# Patient Record
Sex: FEMALE | Race: WHITE | NOT HISPANIC OR LATINO | Employment: FULL TIME | URBAN - METROPOLITAN AREA
[De-identification: names, ages, dates, MRNs, and addresses within clinical notes are randomized per-mention and may not be internally consistent; named-entity substitution may affect disease eponyms.]

---

## 2017-02-03 RX ORDER — ALPRAZOLAM 0.25 MG/1
TABLET ORAL
Qty: 30 TABLET | Refills: 0 | Status: SHIPPED | OUTPATIENT
Start: 2017-02-03 | End: 2017-03-04 | Stop reason: SDUPTHER

## 2017-02-03 NOTE — TELEPHONE ENCOUNTER
DANIELE has been placed on your desk for review.  Patient got medication filled from Dr. Barnett last month.  Please advise.

## 2017-02-14 ENCOUNTER — TRANSCRIBE ORDERS (OUTPATIENT)
Dept: CARDIAC REHAB | Facility: HOSPITAL | Age: 67
End: 2017-02-14

## 2017-02-14 ENCOUNTER — APPOINTMENT (OUTPATIENT)
Dept: CARDIAC REHAB | Facility: HOSPITAL | Age: 67
End: 2017-02-14

## 2017-02-14 DIAGNOSIS — I21.4 NON-ST ELEVATED MYOCARDIAL INFARCTION (NON-STEMI) (HCC): Primary | ICD-10-CM

## 2017-02-15 ENCOUNTER — TREATMENT (OUTPATIENT)
Dept: CARDIAC REHAB | Facility: HOSPITAL | Age: 67
End: 2017-02-15

## 2017-02-15 DIAGNOSIS — Z86.74 H/O CARDIAC ARREST: ICD-10-CM

## 2017-02-15 DIAGNOSIS — Z95.5 S/P CORONARY ARTERY STENT PLACEMENT: ICD-10-CM

## 2017-02-15 DIAGNOSIS — I21.4 MI, ACUTE, NON ST SEGMENT ELEVATION (HCC): Primary | ICD-10-CM

## 2017-02-15 PROCEDURE — 93798 PHYS/QHP OP CAR RHAB W/ECG: CPT

## 2017-02-17 ENCOUNTER — TREATMENT (OUTPATIENT)
Dept: CARDIAC REHAB | Facility: HOSPITAL | Age: 67
End: 2017-02-17

## 2017-02-17 DIAGNOSIS — I21.4 MI, ACUTE, NON ST SEGMENT ELEVATION (HCC): Primary | ICD-10-CM

## 2017-02-17 DIAGNOSIS — Z86.74 H/O CARDIAC ARREST: ICD-10-CM

## 2017-02-17 DIAGNOSIS — Z95.5 S/P CORONARY ARTERY STENT PLACEMENT: ICD-10-CM

## 2017-02-17 PROCEDURE — 93798 PHYS/QHP OP CAR RHAB W/ECG: CPT

## 2017-02-20 ENCOUNTER — TREATMENT (OUTPATIENT)
Dept: CARDIAC REHAB | Facility: HOSPITAL | Age: 67
End: 2017-02-20

## 2017-02-20 DIAGNOSIS — I21.4 MI, ACUTE, NON ST SEGMENT ELEVATION (HCC): Primary | ICD-10-CM

## 2017-02-20 DIAGNOSIS — Z95.5 S/P CORONARY ARTERY STENT PLACEMENT: ICD-10-CM

## 2017-02-20 DIAGNOSIS — Z86.74 H/O CARDIAC ARREST: ICD-10-CM

## 2017-02-20 PROCEDURE — 93798 PHYS/QHP OP CAR RHAB W/ECG: CPT

## 2017-02-22 ENCOUNTER — TREATMENT (OUTPATIENT)
Dept: CARDIAC REHAB | Facility: HOSPITAL | Age: 67
End: 2017-02-22

## 2017-02-22 DIAGNOSIS — Z86.74 H/O CARDIAC ARREST: ICD-10-CM

## 2017-02-22 DIAGNOSIS — Z95.5 S/P CORONARY ARTERY STENT PLACEMENT: ICD-10-CM

## 2017-02-22 DIAGNOSIS — I21.4 MI, ACUTE, NON ST SEGMENT ELEVATION (HCC): Primary | ICD-10-CM

## 2017-02-22 PROCEDURE — 93798 PHYS/QHP OP CAR RHAB W/ECG: CPT

## 2017-02-24 ENCOUNTER — TREATMENT (OUTPATIENT)
Dept: CARDIAC REHAB | Facility: HOSPITAL | Age: 67
End: 2017-02-24

## 2017-02-24 DIAGNOSIS — Z95.5 S/P CORONARY ARTERY STENT PLACEMENT: ICD-10-CM

## 2017-02-24 DIAGNOSIS — I21.4 MI, ACUTE, NON ST SEGMENT ELEVATION (HCC): Primary | ICD-10-CM

## 2017-02-24 DIAGNOSIS — Z86.74 H/O CARDIAC ARREST: ICD-10-CM

## 2017-02-24 PROCEDURE — 93798 PHYS/QHP OP CAR RHAB W/ECG: CPT

## 2017-02-27 ENCOUNTER — TREATMENT (OUTPATIENT)
Dept: CARDIAC REHAB | Facility: HOSPITAL | Age: 67
End: 2017-02-27

## 2017-02-27 DIAGNOSIS — I21.4 MI, ACUTE, NON ST SEGMENT ELEVATION (HCC): Primary | ICD-10-CM

## 2017-02-27 DIAGNOSIS — Z95.5 S/P CORONARY ARTERY STENT PLACEMENT: ICD-10-CM

## 2017-02-27 DIAGNOSIS — Z86.74 H/O CARDIAC ARREST: ICD-10-CM

## 2017-02-27 PROCEDURE — 93798 PHYS/QHP OP CAR RHAB W/ECG: CPT

## 2017-03-03 ENCOUNTER — TREATMENT (OUTPATIENT)
Dept: CARDIAC REHAB | Facility: HOSPITAL | Age: 67
End: 2017-03-03

## 2017-03-03 DIAGNOSIS — Z95.5 S/P CORONARY ARTERY STENT PLACEMENT: ICD-10-CM

## 2017-03-03 DIAGNOSIS — I21.4 MI, ACUTE, NON ST SEGMENT ELEVATION (HCC): Primary | ICD-10-CM

## 2017-03-03 DIAGNOSIS — Z86.74 H/O CARDIAC ARREST: ICD-10-CM

## 2017-03-03 PROCEDURE — 93798 PHYS/QHP OP CAR RHAB W/ECG: CPT

## 2017-03-06 RX ORDER — ALPRAZOLAM 0.25 MG/1
TABLET ORAL
Qty: 30 TABLET | Refills: 0 | OUTPATIENT
Start: 2017-03-06

## 2017-03-06 RX ORDER — ALPRAZOLAM 0.25 MG/1
TABLET ORAL
Qty: 30 TABLET | Refills: 3 | Status: SHIPPED | OUTPATIENT
Start: 2017-03-06 | End: 2017-05-01 | Stop reason: SDUPTHER

## 2017-03-06 NOTE — PROGRESS NOTES
CARDIAC/PULMONARY REHAB NUTRITION EDUCATION/ASSESSMENT      66 y.o.         Height: 67  in    Weight: 143  lb     BMI: 22.39 IBW:  135-145 lb.       Diet Survey Score: 60  Cardiac Risk Factors: Stress, family history Weight Assessment: Normal     Current Diet: moderate calorie, low fat   Food records reviewed? Yes     Occupation:  Adminisrrative  Job Activity Level:mild    Routine Exercise: strong            Pertinent Lab Values:   Total: No components found for: CHOLESTEROL  HDL:   HDL CHOLESTEROL   Date Value Ref Range Status   11/29/2016 33 (L) 40 - 60 mg/dL Final     LDL:No results found for: LDL  Triglyceride: No components found for: TRIGLYCERIDE  Last HGBA1C with date if applicable:No components found for: A1C  Glucose:   GLUCOSE   Date Value Ref Range Status   11/29/2016 99 65 - 99 mg/dL Final    Nutritional Supplements: N/A    Pertinent Nutrition-Related Medications:  N/A         Stated Problem Areas / Concerns: controlling stress,maintaining lifestyle     Assessment / Recommendations: We reviewed AHA guidelines and the merits of a plant based diet  Motivation level toward diet compliance: strong       EdWritten materials given:  Reference information on CVD         Goals:  Continue efforts to follow a heart healthy diet. Maintain exercise as tolerated                 8:08 AM  3/6/2017  Althea Zavala RD

## 2017-03-08 ENCOUNTER — TREATMENT (OUTPATIENT)
Dept: CARDIAC REHAB | Facility: HOSPITAL | Age: 67
End: 2017-03-08

## 2017-03-08 DIAGNOSIS — I21.4 MI, ACUTE, NON ST SEGMENT ELEVATION (HCC): Primary | ICD-10-CM

## 2017-03-08 DIAGNOSIS — Z86.74 H/O CARDIAC ARREST: ICD-10-CM

## 2017-03-08 DIAGNOSIS — Z95.5 S/P CORONARY ARTERY STENT PLACEMENT: ICD-10-CM

## 2017-03-08 PROCEDURE — 93798 PHYS/QHP OP CAR RHAB W/ECG: CPT

## 2017-03-13 ENCOUNTER — APPOINTMENT (OUTPATIENT)
Dept: CARDIAC REHAB | Facility: HOSPITAL | Age: 67
End: 2017-03-13

## 2017-03-15 ENCOUNTER — APPOINTMENT (OUTPATIENT)
Dept: CARDIAC REHAB | Facility: HOSPITAL | Age: 67
End: 2017-03-15

## 2017-03-17 ENCOUNTER — APPOINTMENT (OUTPATIENT)
Dept: CARDIAC REHAB | Facility: HOSPITAL | Age: 67
End: 2017-03-17

## 2017-03-20 ENCOUNTER — APPOINTMENT (OUTPATIENT)
Dept: CARDIAC REHAB | Facility: HOSPITAL | Age: 67
End: 2017-03-20

## 2017-03-22 ENCOUNTER — APPOINTMENT (OUTPATIENT)
Dept: CARDIAC REHAB | Facility: HOSPITAL | Age: 67
End: 2017-03-22

## 2017-03-24 ENCOUNTER — APPOINTMENT (OUTPATIENT)
Dept: CARDIAC REHAB | Facility: HOSPITAL | Age: 67
End: 2017-03-24

## 2017-03-27 ENCOUNTER — APPOINTMENT (OUTPATIENT)
Dept: CARDIAC REHAB | Facility: HOSPITAL | Age: 67
End: 2017-03-27

## 2017-03-29 ENCOUNTER — APPOINTMENT (OUTPATIENT)
Dept: CARDIAC REHAB | Facility: HOSPITAL | Age: 67
End: 2017-03-29

## 2017-03-31 ENCOUNTER — APPOINTMENT (OUTPATIENT)
Dept: CARDIAC REHAB | Facility: HOSPITAL | Age: 67
End: 2017-03-31

## 2017-04-03 ENCOUNTER — APPOINTMENT (OUTPATIENT)
Dept: CARDIAC REHAB | Facility: HOSPITAL | Age: 67
End: 2017-04-03

## 2017-04-05 ENCOUNTER — APPOINTMENT (OUTPATIENT)
Dept: CARDIAC REHAB | Facility: HOSPITAL | Age: 67
End: 2017-04-05

## 2017-04-07 ENCOUNTER — APPOINTMENT (OUTPATIENT)
Dept: CARDIAC REHAB | Facility: HOSPITAL | Age: 67
End: 2017-04-07

## 2017-04-10 ENCOUNTER — APPOINTMENT (OUTPATIENT)
Dept: CARDIAC REHAB | Facility: HOSPITAL | Age: 67
End: 2017-04-10

## 2017-04-12 ENCOUNTER — APPOINTMENT (OUTPATIENT)
Dept: CARDIAC REHAB | Facility: HOSPITAL | Age: 67
End: 2017-04-12

## 2017-04-14 ENCOUNTER — APPOINTMENT (OUTPATIENT)
Dept: CARDIAC REHAB | Facility: HOSPITAL | Age: 67
End: 2017-04-14

## 2017-04-17 ENCOUNTER — APPOINTMENT (OUTPATIENT)
Dept: CARDIAC REHAB | Facility: HOSPITAL | Age: 67
End: 2017-04-17

## 2017-04-19 ENCOUNTER — APPOINTMENT (OUTPATIENT)
Dept: CARDIAC REHAB | Facility: HOSPITAL | Age: 67
End: 2017-04-19

## 2017-04-21 ENCOUNTER — APPOINTMENT (OUTPATIENT)
Dept: CARDIAC REHAB | Facility: HOSPITAL | Age: 67
End: 2017-04-21

## 2017-04-24 ENCOUNTER — APPOINTMENT (OUTPATIENT)
Dept: CARDIAC REHAB | Facility: HOSPITAL | Age: 67
End: 2017-04-24

## 2017-04-26 ENCOUNTER — APPOINTMENT (OUTPATIENT)
Dept: CARDIAC REHAB | Facility: HOSPITAL | Age: 67
End: 2017-04-26

## 2017-04-28 ENCOUNTER — APPOINTMENT (OUTPATIENT)
Dept: CARDIAC REHAB | Facility: HOSPITAL | Age: 67
End: 2017-04-28

## 2017-05-01 ENCOUNTER — APPOINTMENT (OUTPATIENT)
Dept: CARDIAC REHAB | Facility: HOSPITAL | Age: 67
End: 2017-05-01

## 2017-05-02 ENCOUNTER — TELEPHONE (OUTPATIENT)
Dept: FAMILY MEDICINE CLINIC | Facility: CLINIC | Age: 67
End: 2017-05-02

## 2017-05-02 RX ORDER — ALPRAZOLAM 0.25 MG/1
0.25 TABLET ORAL DAILY
Qty: 30 TABLET | Refills: 1 | OUTPATIENT
Start: 2017-05-02

## 2017-05-02 RX ORDER — ALPRAZOLAM 0.25 MG/1
TABLET ORAL
Qty: 30 TABLET | Refills: 0 | OUTPATIENT
Start: 2017-05-02 | End: 2017-05-02 | Stop reason: SDUPTHER

## 2017-05-03 ENCOUNTER — APPOINTMENT (OUTPATIENT)
Dept: CARDIAC REHAB | Facility: HOSPITAL | Age: 67
End: 2017-05-03

## 2017-05-05 ENCOUNTER — APPOINTMENT (OUTPATIENT)
Dept: CARDIAC REHAB | Facility: HOSPITAL | Age: 67
End: 2017-05-05

## 2017-05-08 ENCOUNTER — APPOINTMENT (OUTPATIENT)
Dept: CARDIAC REHAB | Facility: HOSPITAL | Age: 67
End: 2017-05-08

## 2017-05-10 ENCOUNTER — APPOINTMENT (OUTPATIENT)
Dept: CARDIAC REHAB | Facility: HOSPITAL | Age: 67
End: 2017-05-10

## 2017-05-12 ENCOUNTER — APPOINTMENT (OUTPATIENT)
Dept: CARDIAC REHAB | Facility: HOSPITAL | Age: 67
End: 2017-05-12

## 2017-05-15 ENCOUNTER — APPOINTMENT (OUTPATIENT)
Dept: CARDIAC REHAB | Facility: HOSPITAL | Age: 67
End: 2017-05-15

## 2017-05-17 ENCOUNTER — APPOINTMENT (OUTPATIENT)
Dept: CARDIAC REHAB | Facility: HOSPITAL | Age: 67
End: 2017-05-17

## 2017-05-19 ENCOUNTER — APPOINTMENT (OUTPATIENT)
Dept: CARDIAC REHAB | Facility: HOSPITAL | Age: 67
End: 2017-05-19

## 2020-10-05 ENCOUNTER — HOSPITAL ENCOUNTER (OUTPATIENT)
Dept: GENERAL RADIOLOGY | Facility: HOSPITAL | Age: 70
Discharge: HOME | End: 2020-10-05
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
## 2020-10-05 PROCEDURE — 73030 X-RAY EXAM OF SHOULDER: CPT

## 2020-10-05 PROCEDURE — 72050 X-RAY EXAM NECK SPINE 4/5VWS: CPT

## 2021-06-28 ENCOUNTER — TRANSCRIBE ORDERS (OUTPATIENT)
Dept: PHYSICAL THERAPY | Facility: CLINIC | Age: 71
End: 2021-06-28

## 2021-06-28 DIAGNOSIS — Z96.641 STATUS POST TOTAL HIP REPLACEMENT, RIGHT: Primary | ICD-10-CM

## 2021-08-11 ENCOUNTER — TREATMENT (OUTPATIENT)
Dept: PHYSICAL THERAPY | Facility: CLINIC | Age: 71
End: 2021-08-11

## 2021-08-11 DIAGNOSIS — M25.551 RIGHT HIP PAIN: Primary | ICD-10-CM

## 2021-08-11 DIAGNOSIS — R26.2 DIFFICULTY WALKING: ICD-10-CM

## 2021-08-11 PROCEDURE — 97110 THERAPEUTIC EXERCISES: CPT | Performed by: PHYSICAL THERAPIST

## 2021-08-11 PROCEDURE — 97162 PT EVAL MOD COMPLEX 30 MIN: CPT | Performed by: PHYSICAL THERAPIST

## 2021-08-11 NOTE — PROGRESS NOTES
Physical Therapy Evaluation      Patient: Kyleigh Curry   : 1950  Diagnosis/ICD-10 Code:  Right hip pain [M25.551]  Referring practitioner: Israel Carrion MD  Date of Initial Visit: Type: THERAPY  Noted: 2021  Today's Date: 2021  Patient seen for 1 sessions         Subjective Evaluation     History of Present Illness  Mechanism of injury: Patient underwent R MEGHAN on 2021. Posterior approach by Dr. Rowland. Patient had home health therapy, which she was discharged from on . Prior to surgery, patient reports that she has had pain in her hip from OA for around 10 years. The pain worsened over time, eventually bothering her during all activities and rest.    Since her surgery, patient reports that she has pain from her hip down to her mid thigh, which worsens with activity. She has lack of sensation from her R lateral hip to mid-thigh. She walks with a cane and is only able to ambulate short distances at this time.    PLOF: Before MEGHAN, patient played tennis 4x/wk and worked out 3x/wk at [employer]. Did not use AD    PMH: Controlled HTN. Patient reports she had a stent placed.       Patient Occupation: retired  Quality of life: good    Pain  Current pain rating: 3  At best pain ratin  At worst pain ratin  Quality: throbbing and discomfort (irritating)  Relieving factors: medications, ice, relaxation and rest (extra strength)  Aggravating factors: stairs, ambulation, movement, standing and sleeping  Progression: improved     Social Support  Lives in: multiple-level home (with elevator )  Lives with: spouse     Diagnostic Tests  X-ray: normal (MEGHAN healing)     Treatments  Current treatment: home therapy  Patient Goals  Patient goals for therapy: increased strength, decreased edema, decreased pain, increased motion, improved balance and return to sport/leisure activities  Patient goal: walk for exercise , walk without cane              Objective            Active Range of Motion    Left Hip   External rotation (90/90): 18 degrees   Internal rotation (90/90): 20 degrees      Right Hip   External rotation (90/90): 15 degrees with pain  Internal rotation (90/90): 10 degrees with pain     Passive Range of Motion      Right Hip   Flexion: 39 degrees   Abduction: 10 degrees     Additional Passive Range of Motion Details  Flexion measured in supine. In sitting patient able to sit at 90     Strength/Myotome Testing      Left Hip   Planes of Motion   Flexion: 4-  Abduction: 3     Right Hip   Planes of Motion   Flexion: 3-     Left Knee   Flexion: 4  Extension: 4+     Right Knee   Flexion: 4-  Extension: 3+     Left Ankle/Foot   Dorsiflexion: 4     Right Ankle/Foot   Dorsiflexion: 4     Ambulation      Comments   With SC: L shoulder elevated, slow ronal, varum in L Knee, lack of arm swing, lack of heel strike at initial contact.       Sensation  Patient has diminished sensation in her R lateral hip to R lateral mid thigh.    Observation  Patient's incision looks good. No signs of infection.     See Exercise, Manual, and Modality Logs for complete treatment.         Functional Outcome Score: LEFS: 44/80     EXERCISES:  -Supine hip abduction x15  -Supine marches x30  -Supine knee slides x10     Assessment & Plan     Assessment  Impairments: abnormal gait, abnormal muscle firing, abnormal or restricted ROM, activity intolerance, impaired balance, impaired physical strength, lacks appropriate home exercise program, pain with function and safety issue  Assessment details: Kyleigh Curry is a 71 y.o. year-old female referred to physical therapy for R hip pain s/p MEGHAN on 7/27/21. She presents with a evolving clinical presentation.  She  has comorbidities of hypertension and no known personal factors which may affect her progress in the plan of care.  Signs and symptoms are consistent with physical therapy diagnosis of R hip pain and difficulty walking. Patient is appropriate for skilled physical therapy in  order to reduce pain and increase ease with daily mobility.   Prognosis: good  Functional Limitations: walking, uncomfortable because of pain, sitting and standing  Goals  Plan Goals: Short Term Goals for completion in 30 days:   1) Patient will report compliance and independence with initial HEP   2) Patient will improve score on LEFS from 44/80 to a 52/80 to demonstrate an improvement in function w/ ADLs.  3) Patient will rate her maximal pain a 5/10 (at eval 8/10) in order to increase ease with movement and improve QOL.    LTGs for completion within 90 days:  1) Patient will increase walking tolerance from 5 min to 20 min or greater to allow for patient to walk for leisure/exercise   2) Patient will increase R LE hip flexion and abduction strength to 4/5 or greater to increase LE stability during gait    3) Patient will be able to walk community distances without use of cane in order to increase independence with activities like grocery shopping.   4) Patient will improve hip ER ROM to 25 degrees bilaterally in order to increase ease with putting on shoes.        Plan  Therapy options: will be seen for skilled physical therapy services  Planned modality interventions: dry needling, hydrotherapy, traction, TENS, high voltage pulsed current (pain management) and ultrasound  Planned therapy interventions: transfer training, home exercise program, therapeutic activities, stretching, gait training, functional ROM exercises, strengthening, flexibility, postural training, spinal/joint mobilization, ADL retraining and manual therapy  Frequency: 2x week  Duration in visits: 20  Duration in weeks: 10  Treatment plan discussed with: patient  Plan details: Physical therapy for hip strengthening, balance, gait training, LE ROM, and pain relief.             Manual Therapy:         mins  58656;  Therapeutic Exercise:    8     mins  07827;     Neuromuscular Yanely:        mins  95124;    Therapeutic Activity:          mins  98413;      Gait Training:           mins  41636;     Ultrasound:          mins  31113;    Electrical Stimulation:         mins  80447 ( );  Dry Needling          mins self-pay    Timed Treatment:   8   mins   Total Treatment:     40   mins    Sonal Rosales PT  Physical Therapist      I was present in the PT Department guiding the student by approving, concurring, and confirming the skilled judgement for all services rendered

## 2021-08-13 ENCOUNTER — TREATMENT (OUTPATIENT)
Dept: PHYSICAL THERAPY | Facility: CLINIC | Age: 71
End: 2021-08-13

## 2021-08-13 DIAGNOSIS — M25.551 RIGHT HIP PAIN: Primary | ICD-10-CM

## 2021-08-13 DIAGNOSIS — R26.2 DIFFICULTY WALKING: ICD-10-CM

## 2021-08-13 PROCEDURE — 97116 GAIT TRAINING THERAPY: CPT | Performed by: PHYSICAL THERAPIST

## 2021-08-13 PROCEDURE — 97110 THERAPEUTIC EXERCISES: CPT | Performed by: PHYSICAL THERAPIST

## 2021-08-13 NOTE — PROGRESS NOTES
Physical Therapy Daily Progress Note    Patient: Kyleigh Curry   : 1950  Diagnosis/ICD-10 Code:  Right hip pain [M25.551]  Referring practitioner: Israel Carrion MD  Date of Initial Visit: Type: THERAPY  Noted: 2021  Today's Date: 2021  Patient seen for 2 sessions           Subjective   Patient reports she has been doing her exercises at home and walking up and down stairs more.    Objective   See Exercise, Manual, and Modality Logs for complete treatment.     EXERCISES:  -NuStep lvl 8 x5 min  -Gait training w/ SC 1.5 laps  -1K leg press 1x20 B 80 lbs, 2x20 140 lb  -6K Hip abduction 3x15 25 lb  -Weighted hip extension, 2.5 lb 2x15  -Standing hip abduction 1x15  -Supine hip abduction x15  -Supine marches x30  -Supine knee slides x10      Assessment/Plan  First treatment session today. Focused on standing up tall and even step length during ambulation. Gait deficits became more pronounced with increased distance. Attempted weighted standing hip abduction but patient was unable to perform without significant pain.       Timed:    Manual Therapy:         mins  48624;  Therapeutic Exercise:    30     mins  00402;     Neuromuscular Yanely:        mins  92833;    Therapeutic Activity:          mins  45641;     Gait Training:      10     mins  62096;     Ultrasound:          mins  98885;    Electrical Stimulation:         mins  08962 ( );  Iontophoresis         mins 45022;  Aquatic Therapy         mins 82764;  Dry Needling                   mins    Untimed:  Electrical Stimulation:         mins  96057 ( );  Mechanical Traction:         mins  67917;     Timed Treatment:   40   mins   Total Treatment:     40   mins  Sonal Rosales PT  Physical Therapist

## 2021-08-16 ENCOUNTER — TREATMENT (OUTPATIENT)
Dept: PHYSICAL THERAPY | Facility: CLINIC | Age: 71
End: 2021-08-16

## 2021-08-16 DIAGNOSIS — M25.551 RIGHT HIP PAIN: Primary | ICD-10-CM

## 2021-08-16 DIAGNOSIS — R26.2 DIFFICULTY WALKING: ICD-10-CM

## 2021-08-16 PROCEDURE — 97116 GAIT TRAINING THERAPY: CPT | Performed by: PHYSICAL THERAPIST

## 2021-08-16 PROCEDURE — 97110 THERAPEUTIC EXERCISES: CPT | Performed by: PHYSICAL THERAPIST

## 2021-08-16 NOTE — PROGRESS NOTES
"Physical Therapy Daily Progress Note    Patient: Kyleigh Curry   : 1950  Diagnosis/ICD-10 Code:  Right hip pain [M25.551]  Referring practitioner: Israel Carrion MD  Date of Initial Visit: Type: THERAPY  Noted: 2021  Today's Date: 2021  Patient seen for 3 sessions           Subjective   Patient reports she started walking a little without her cane this morning.     Objective     See Exercise, Manual, and Modality Logs for complete treatment.     EXERCISES:  -NuStep level 8 x 5 min  -Gait training w/ SC 2 laps  -1K leg press 1x20 B 80 lbs, 2x20 140 lb  -6K Hip abduction 3x15 25 lb  -4\" Step ups F/L x20 on R LE  -Weighted hip extension, 2.5 lb 2x10  -Standing hip abduction 2.5 lbs, 2x5  -Supine hip abduction x15  -Supine marches x30  -Supine heel slides 5 with 1 SLR (total of 30 reps)  -Supine clamshells with GTB 2x15    Assessment/Plan  Patient was able to better tolerate hip abduction kicks with resistance but had to limit the reps due to muscle weakness. Attempted SLR but patient was unable to tolerate multiple reps therefore had her perform combo of 5 heel slides with 1 SLR for total of 30 reps. Added step ups with emphasis on maintaining a level pelvis. Also walked short distance on track without SC but mild R LE antaglia became more pronounced as she fatigued.          Timed:    Manual Therapy:         mins  98869;  Therapeutic Exercise:    32     mins  78004;     Neuromuscular Yanely:        mins  42396;    Therapeutic Activity:          mins  25786;     Gait Training:      10     mins  25795;     Ultrasound:          mins  65389;    Electrical Stimulation:         mins  41175 ( );  Iontophoresis         mins 86716;  Aquatic Therapy         mins 75139;  Dry Needling                   mins    Untimed:  Electrical Stimulation:         mins  65199 ( );  Mechanical Traction:         mins  23171;     Timed Treatment:   42   mins   Total Treatment:     42   mins  Sonal Rosales" PT  Physical Therapist

## 2021-08-20 ENCOUNTER — TREATMENT (OUTPATIENT)
Dept: PHYSICAL THERAPY | Facility: CLINIC | Age: 71
End: 2021-08-20

## 2021-08-20 DIAGNOSIS — M25.551 RIGHT HIP PAIN: Primary | ICD-10-CM

## 2021-08-20 DIAGNOSIS — R26.2 DIFFICULTY WALKING: ICD-10-CM

## 2021-08-20 PROCEDURE — 97110 THERAPEUTIC EXERCISES: CPT | Performed by: PHYSICAL THERAPIST

## 2021-08-20 PROCEDURE — 97116 GAIT TRAINING THERAPY: CPT | Performed by: PHYSICAL THERAPIST

## 2021-08-20 NOTE — PROGRESS NOTES
"Physical Therapy Daily Progress Note    Patient: Kyleigh Curry   : 1950  Diagnosis/ICD-10 Code:  Right hip pain [M25.551]  Referring practitioner: Israel Carrion MD  Date of Initial Visit: Type: THERAPY  Noted: 2021  Today's Date: 2021  Patient seen for 4 sessions           Subjective   Patient reports she is using the cane a lot less at home but she uses it in the community.     Objective   See Exercise, Manual, and Modality Logs for complete treatment.     EXERCISES:  -Recumbent bike x 5 min  -Gait training w/ SC 2 laps  -1K leg press 1x20 B 80 lbs, 2x20 140 lb  -6K Hip abduction 3x15 25 lb  -4\" Step ups F/L x20 on R LE  -Weighted hip extension, 2.5 lb 2x10  -Standing hip abduction 2.5 lbs, 2x5  -Supine hip abduction x15  -Supine marches x30  -Supine heel slides 30x  -Supine clamshells with GTB 2x15    Assessment/Plan  Patient was able to utilize recumbent bike for warm up today. She continues to walk with significant antalgia when initiating gait due to stiffness but once she takes a few steps she walks more smoothly. She also still stands upright with slight hip flexion, likely due to habit from prior OA/pain.          Timed:    Manual Therapy:         mins  64655;  Therapeutic Exercise:    30     mins  70855;     Neuromuscular Yanely:        mins  55857;    Therapeutic Activity:          mins  49255;     Gait Training:      10     mins  72200;     Ultrasound:          mins  46159;    Electrical Stimulation:         mins  23696 ( );  Iontophoresis         mins 93730;  Aquatic Therapy         mins 64754;  Dry Needling                   mins    Untimed:  Electrical Stimulation:         mins  68626 ( );  Mechanical Traction:         mins  92208;     Timed Treatment:   40   mins   Total Treatment:     40   mins  Sonal Rosales PT  Physical Therapist  "

## 2021-08-23 ENCOUNTER — TREATMENT (OUTPATIENT)
Dept: PHYSICAL THERAPY | Facility: CLINIC | Age: 71
End: 2021-08-23

## 2021-08-23 DIAGNOSIS — R26.2 DIFFICULTY WALKING: ICD-10-CM

## 2021-08-23 DIAGNOSIS — M25.551 RIGHT HIP PAIN: Primary | ICD-10-CM

## 2021-08-23 PROCEDURE — 97110 THERAPEUTIC EXERCISES: CPT | Performed by: PHYSICAL THERAPIST

## 2021-08-23 PROCEDURE — 97530 THERAPEUTIC ACTIVITIES: CPT | Performed by: PHYSICAL THERAPIST

## 2021-08-23 NOTE — PROGRESS NOTES
"Physical Therapy Daily Progress Note    Patient: Kyleigh Curry   : 1950  Diagnosis/ICD-10 Code:  Right hip pain [M25.551]  Referring practitioner: Israel Carrion MD  Date of Initial Visit: Type: THERAPY  Noted: 2021  Today's Date: 2021  Patient seen for 5 sessions           Subjective   Patient reports she was pretty sore after the last session but it only for about a day.    Objective   See Exercise, Manual, and Modality Logs for complete treatment.     EXERCISES:  -Recumbent bike x 5 min  -Gait training w/ SC 2 laps  -1K leg press 1x20 B 140 lbs, 2x20 R  lb  -6K Hip abduction 3x15 25 lb  -4\" Step ups F/L x20 on R LE  -Weighted hip extension, 2.5 lb 2x10  -Weighted hip abduction 2.5 lbs, 2x5  -Sit to stand from mat, no hands x10  -Supine hip abduction x15  -Supine marches x30  -Supine heel slides 30x with intermittent SLR  -Supine clamshells with GTB 2x15    Assessment/Plan  Worked on transferring from mat without reliance on UE support and with emphasis on equal WB. Patient was able to do a few intermittent reps of SLR today without severe pain but still can't do them consecutively. Stiffness when initiating gait was also a little less pronounced today.         Timed:    Manual Therapy:         mins  50316;  Therapeutic Exercise:    32     mins  57298;     Neuromuscular Yanely:        mins  29778;    Therapeutic Activity:     9     mins  29909;     Gait Training:           mins  01705;     Ultrasound:          mins  00268;    Electrical Stimulation:         mins  45543 ( );  Iontophoresis         mins 26988;  Aquatic Therapy         mins 74618;  Dry Needling                   mins    Untimed:  Electrical Stimulation:         mins  45183 ( );  Mechanical Traction:         mins  91847;     Timed Treatment:   41   mins   Total Treatment:     41   mins  Sonal Rosales PT  Physical Therapist  "

## 2021-08-25 ENCOUNTER — TREATMENT (OUTPATIENT)
Dept: PHYSICAL THERAPY | Facility: CLINIC | Age: 71
End: 2021-08-25

## 2021-08-25 DIAGNOSIS — M25.551 RIGHT HIP PAIN: ICD-10-CM

## 2021-08-25 DIAGNOSIS — R26.2 DIFFICULTY WALKING: Primary | ICD-10-CM

## 2021-08-25 PROCEDURE — 97110 THERAPEUTIC EXERCISES: CPT | Performed by: PHYSICAL THERAPIST

## 2021-08-25 PROCEDURE — 97116 GAIT TRAINING THERAPY: CPT | Performed by: PHYSICAL THERAPIST

## 2021-08-25 NOTE — PROGRESS NOTES
"Physical Therapy Daily Progress Note    Patient: Kyleigh Curry   : 1950  Diagnosis/ICD-10 Code:  Difficulty walking [R26.2]  Referring practitioner: Israel Carrion MD  Date of Initial Visit: Type: THERAPY  Noted: 2021  Today's Date: 2021  Patient seen for 6 sessions           Subjective   Kyleigh reports she is trying to walk more at home.    Objective   See Exercise, Manual, and Modality Logs for complete treatment.     EXERCISES:  -Recumbent bike x 5 min  -Gait training w/ SC 2 laps  -1K leg press 1x20 B 140 lbs, 2x20 R  lb  -6K Hip abduction 3x15 25 lb  -4\" Step ups F/L x20 on R LE  -Weighted hip extension, 2.5 lb 2x10  -Weighted hip abduction 2.5 lbs, 2x5  -Sit to stand from mat, no hands x10  -Supine hip abduction x15  -Supine marches x30  -Supine heel slides 30x with intermittent SLR  -Supine clamshells with GTB 2x15  -Side stepping and monster walks with yellow band, 20 ft x2     Assessment/Plan  Kyleigh is now able to walk with less antalgia and a faster gait speed. Added side stepping and monster walks with yellow band to further strengthen hips. Still some weakness noted in R hip flexors when lifting leg on/off machines.         Timed:    Manual Therapy:         mins  02840;  Therapeutic Exercise:    30     mins  68857;     Neuromuscular Yanely:        mins  15707;    Therapeutic Activity:          mins  85331;     Gait Training:     10     mins  65891;     Ultrasound:          mins  58756;    Electrical Stimulation:         mins  64087 ( );  Iontophoresis         mins 46021;  Aquatic Therapy         mins 13589;  Dry Needling                   mins    Untimed:  Electrical Stimulation:         mins  47477 ( );  Mechanical Traction:         mins  39146;     Timed Treatment:   40   mins   Total Treatment:     40   mins  Sonal Rosales PT  Physical Therapist  "

## 2021-09-01 ENCOUNTER — TREATMENT (OUTPATIENT)
Dept: PHYSICAL THERAPY | Facility: CLINIC | Age: 71
End: 2021-09-01

## 2021-09-01 DIAGNOSIS — R26.2 DIFFICULTY WALKING: Primary | ICD-10-CM

## 2021-09-01 DIAGNOSIS — M25.551 RIGHT HIP PAIN: ICD-10-CM

## 2021-09-01 PROCEDURE — 97110 THERAPEUTIC EXERCISES: CPT | Performed by: PHYSICAL THERAPIST

## 2021-09-01 PROCEDURE — 97530 THERAPEUTIC ACTIVITIES: CPT | Performed by: PHYSICAL THERAPIST

## 2021-09-01 NOTE — PROGRESS NOTES
"Physical Therapy Daily Progress Note    Patient: Kyleigh Curry   : 1950  Diagnosis/ICD-10 Code:  Difficulty walking [R26.2]  Referring practitioner: Israel Carrion MD  Date of Initial Visit: Type: THERAPY  Noted: 2021  Today's Date: 2021  Patient seen for 7 sessions           Subjective   Patient reports she is feeling about ready to discharge. She is starting to work with her  again and is able to do almost everything.    Objective   See Exercise, Manual, and Modality Logs for complete treatment.     EXERCISES:  -Recumbent bike x 5 min  -Gait training no AD 2 laps  -1K leg press 1x20 B 140 lbs, 2x20 R LE 140 lb  -6K Hip abduction 3x15 25 lb  -4\" Step ups F/L x20 on R LE  -Weighted hip extension, 2.5 lb 2x10  -Weighted hip abduction 2.5 lbs, 2x10  -Sit to stand from mat, no hands x10  -Sidelying hip abduction 2 x15  -Supine marches x30  -Supine 2x10 SLR  -Side stepping and monster walks with yellow band, 20 ft x2     Assessment/Plan  Kyleigh demonstrates improve hip strength and was able to perform 10 reps of both standing hip abduction with cable resistance and supine SLR. She is still walking with mild antalgia for her first 2-3 steps but then her gait greatly improves. She has given away her cane and is no longer using it for community mobility.          Timed:    Manual Therapy:         mins  29446;  Therapeutic Exercise:    30     mins  85186;     Neuromuscular Yanely:        mins  19191;    Therapeutic Activity:     10     mins  22421;     Gait Training:           mins  31373;     Ultrasound:          mins  46980;    Electrical Stimulation:         mins  06910 ( );  Iontophoresis         mins 44127;  Aquatic Therapy         mins 74794;  Dry Needling                   mins    Untimed:  Electrical Stimulation:         mins  12283 ( );  Mechanical Traction:         mins  59209;     Timed Treatment:   40   mins   Total Treatment:     40   mins  Sonal Rosales PT  Physical " Therapist

## 2021-09-03 ENCOUNTER — TREATMENT (OUTPATIENT)
Dept: PHYSICAL THERAPY | Facility: CLINIC | Age: 71
End: 2021-09-03

## 2021-09-03 DIAGNOSIS — R26.2 DIFFICULTY WALKING: Primary | ICD-10-CM

## 2021-09-03 DIAGNOSIS — M25.551 RIGHT HIP PAIN: ICD-10-CM

## 2021-09-03 PROCEDURE — 97110 THERAPEUTIC EXERCISES: CPT | Performed by: PHYSICAL THERAPIST

## 2021-09-03 PROCEDURE — 97530 THERAPEUTIC ACTIVITIES: CPT | Performed by: PHYSICAL THERAPIST

## 2021-09-03 NOTE — PROGRESS NOTES
"Physical Therapy Daily Progress Note    Patient: Kyleigh Curry   : 1950  Diagnosis/ICD-10 Code:  Difficulty walking [R26.2]  Referring practitioner: Israel Carrion MD  Date of Initial Visit: Type: THERAPY  Noted: 2021  Today's Date: 9/3/2021  Patient seen for 8 sessions           Subjective   Kyleigh reports she is feeling a lot better and is happy with how far she has come.    Objective   See Exercise, Manual, and Modality Logs for complete treatment.     EXERCISES:  -Recumbent bike x 5 min  -Gait training no AD 2 laps  -1K leg press 1x20 B 140 lbs, 2x20 R LE 140 lb  -6K Hip abduction 3x15 25 lb  -4\" Step ups F/L x20 on R LE  -Weighted hip extension, 2.5 lb 2x10  -Weighted hip abduction 2.5 lbs, 2x10  -Sit to stand from mat, no hands x10 *defer  -Bridges 2x15  -Supine 2x10 SLR  -Side stepping and monster walks with yellow band, 20 ft x2     Assessment/Plan   Kyleigh has vastly improved her hip strength and quality of gait. She is no longer relying on a cane for ambulation and she is now able to negotiate reciprocally up/down stairs. She is a member at the gym and would like to independently progress on her own. Recommend discharge from skilled PT.    Short Term Goals for completion in 30 days:   1) Patient will report compliance and independence with initial HEP  (MET)  2) Patient will improve score on LEFS from 44/80 to a 52/80 to demonstrate an improvement in function w/ ADLs. (NOT MET, NOT TESTED)  3) Patient will rate her maximal pain a 5/10 (at eval 8/10) in order to increase ease with movement and improve QOL. (MET)    LTGs for completion within 90 days:  1) Patient will increase walking tolerance from 5 min to 20 min or greater to allow for patient to walk for leisure/exercise (MET)  2) Patient will increase R LE hip flexion and abduction strength to 4/5 or greater to increase LE stability during gait  (MET)   3) Patient will be able to walk community distances without use of cane in order to " increase independence with activities like grocery shopping.  (MET)  4) Patient will improve hip ER ROM to 25 degrees bilaterally in order to increase ease with putting on shoes. (NOT TESTED, NOT MET)            Timed:    Manual Therapy:         mins  27563;  Therapeutic Exercise:    32     mins  25287;     Neuromuscular Yanely:        mins  23962;    Therapeutic Activity:     8     mins  75644;     Gait Training:           mins  24780;     Ultrasound:          mins  96452;    Electrical Stimulation:         mins  03961 ( );  Iontophoresis         mins 06541;  Aquatic Therapy         mins 36460;  Dry Needling                   mins    Untimed:  Electrical Stimulation:         mins  31405 ( );  Mechanical Traction:         mins  74965;     Timed Treatment:   40   mins   Total Treatment:     40   mins  Sonal Rosales PT  Physical Therapist

## 2023-06-26 ENCOUNTER — CONSULTATION/EVALUATION (OUTPATIENT)
Dept: URBAN - METROPOLITAN AREA CLINIC 32 | Facility: CLINIC | Age: 73
End: 2023-06-26

## 2023-06-26 DIAGNOSIS — Z98.890: ICD-10-CM

## 2023-06-26 DIAGNOSIS — H16.223: ICD-10-CM

## 2023-06-26 DIAGNOSIS — H25.13: ICD-10-CM

## 2023-06-26 DIAGNOSIS — H25.043: ICD-10-CM

## 2023-06-26 DIAGNOSIS — H40.1131: ICD-10-CM

## 2023-06-26 PROCEDURE — 76514 ECHO EXAM OF EYE THICKNESS: CPT

## 2023-06-26 PROCEDURE — 92133 CPTRZD OPH DX IMG PST SGM ON: CPT

## 2023-06-26 PROCEDURE — 92004 COMPRE OPH EXAM NEW PT 1/>: CPT

## 2023-06-26 ASSESSMENT — VISUAL ACUITY
OD_SC: 20/25-1
OS_SC: 20/25
OD_SC: J7
OD_CC: J1+
OS_CC: J1+
OS_SC: J5-1

## 2023-06-26 ASSESSMENT — PACHYMETRY
OS_CT_UM: 506
OD_CT_UM: 512

## 2023-06-26 ASSESSMENT — TONOMETRY
OS_IOP_MMHG: 9
OD_IOP_MMHG: 8

## 2023-06-26 ASSESSMENT — KERATOMETRY
OS_K1POWER_DIOPTERS: 40.75
OD_AXISANGLE_DEGREES: 54
OD_K2POWER_DIOPTERS: 40.50
OD_AXISANGLE2_DEGREES: 144
OS_AXISANGLE_DEGREES: 123
OS_AXISANGLE2_DEGREES: 33
OD_K1POWER_DIOPTERS: 41.25
OS_K2POWER_DIOPTERS: 40.00

## 2024-01-22 ENCOUNTER — FOLLOW UP (OUTPATIENT)
Dept: URBAN - METROPOLITAN AREA CLINIC 32 | Facility: CLINIC | Age: 74
End: 2024-01-22

## 2024-01-22 DIAGNOSIS — H40.1131: ICD-10-CM

## 2024-01-22 PROCEDURE — 92020 GONIOSCOPY: CPT

## 2024-01-22 PROCEDURE — 92083 EXTENDED VISUAL FIELD XM: CPT

## 2024-01-22 PROCEDURE — 99213 OFFICE O/P EST LOW 20 MIN: CPT

## 2024-01-22 ASSESSMENT — VISUAL ACUITY
OD_SC: 20/30
OS_SC: 20/25-2

## 2024-01-22 ASSESSMENT — KERATOMETRY
OS_AXISANGLE2_DEGREES: 33
OS_K2POWER_DIOPTERS: 40.00
OD_AXISANGLE2_DEGREES: 144
OD_AXISANGLE_DEGREES: 54
OS_AXISANGLE_DEGREES: 123
OD_K1POWER_DIOPTERS: 41.25
OS_K1POWER_DIOPTERS: 40.75
OD_K2POWER_DIOPTERS: 40.50

## 2024-01-22 ASSESSMENT — TONOMETRY
OD_IOP_MMHG: 14
OS_IOP_MMHG: 14

## 2024-06-04 ENCOUNTER — COMPREHENSIVE EXAM (OUTPATIENT)
Dept: URBAN - METROPOLITAN AREA CLINIC 32 | Facility: CLINIC | Age: 74
End: 2024-06-04

## 2024-06-04 DIAGNOSIS — H25.043: ICD-10-CM

## 2024-06-04 DIAGNOSIS — H16.223: ICD-10-CM

## 2024-06-04 DIAGNOSIS — Z98.890: ICD-10-CM

## 2024-06-04 DIAGNOSIS — H25.13: ICD-10-CM

## 2024-06-04 DIAGNOSIS — H35.033: ICD-10-CM

## 2024-06-04 DIAGNOSIS — H40.1131: ICD-10-CM

## 2024-06-04 PROCEDURE — 92133 CPTRZD OPH DX IMG PST SGM ON: CPT

## 2024-06-04 PROCEDURE — 99214 OFFICE O/P EST MOD 30 MIN: CPT

## 2024-06-04 ASSESSMENT — KERATOMETRY
OS_K1POWER_DIOPTERS: 40.75
OD_AXISANGLE2_DEGREES: 144
OS_AXISANGLE_DEGREES: 123
OD_AXISANGLE_DEGREES: 54
OS_K1POWER_DIOPTERS: 40.50
OS_AXISANGLE_DEGREES: 115
OD_AXISANGLE_DEGREES: 65
OS_K2POWER_DIOPTERS: 40.00
OD_K1POWER_DIOPTERS: 41.25
OS_K2POWER_DIOPTERS: 39.75
OS_AXISANGLE2_DEGREES: 25
OD_AXISANGLE2_DEGREES: 155
OD_K2POWER_DIOPTERS: 40.50
OD_K1POWER_DIOPTERS: 41.50
OS_AXISANGLE2_DEGREES: 33

## 2024-06-04 ASSESSMENT — VISUAL ACUITY
OD_SC: 20/40
OD_SC: J7-1
OD_PH: 20/30
OD_GLARE: 20/50-1
OD_CC: J1+/-2
OS_CC: J1+
OS_SC: J5
OS_SC: 20/30-1
OS_GLARE: 20/60

## 2024-06-04 ASSESSMENT — TONOMETRY
OS_IOP_MMHG: 14
OD_IOP_MMHG: 12

## 2024-12-02 ENCOUNTER — FOLLOW UP (OUTPATIENT)
Age: 74
End: 2024-12-02

## 2024-12-02 DIAGNOSIS — H40.1131: ICD-10-CM

## 2024-12-02 PROCEDURE — 92083 EXTENDED VISUAL FIELD XM: CPT

## 2024-12-02 PROCEDURE — 99213 OFFICE O/P EST LOW 20 MIN: CPT

## 2025-06-04 ENCOUNTER — COMPREHENSIVE EXAM (OUTPATIENT)
Age: 75
End: 2025-06-04

## 2025-06-04 DIAGNOSIS — H16.223: ICD-10-CM

## 2025-06-04 DIAGNOSIS — H40.1131: ICD-10-CM

## 2025-06-04 DIAGNOSIS — H35.033: ICD-10-CM

## 2025-06-04 DIAGNOSIS — H25.043: ICD-10-CM

## 2025-06-04 DIAGNOSIS — H25.13: ICD-10-CM

## 2025-06-04 PROCEDURE — 99214 OFFICE O/P EST MOD 30 MIN: CPT

## 2025-06-04 PROCEDURE — 92133 CPTRZD OPH DX IMG PST SGM ON: CPT

## 2025-06-04 PROCEDURE — 92015 DETERMINE REFRACTIVE STATE: CPT
